# Patient Record
Sex: FEMALE | Race: BLACK OR AFRICAN AMERICAN | Employment: OTHER
[De-identification: names, ages, dates, MRNs, and addresses within clinical notes are randomized per-mention and may not be internally consistent; named-entity substitution may affect disease eponyms.]

---

## 2023-12-27 ENCOUNTER — HOSPITAL ENCOUNTER (EMERGENCY)
Facility: HOSPITAL | Age: 48
Discharge: HOME | End: 2023-12-27

## 2023-12-27 ENCOUNTER — APPOINTMENT (OUTPATIENT)
Facility: HOSPITAL | Age: 48
End: 2023-12-27

## 2023-12-27 VITALS
TEMPERATURE: 98.8 F | RESPIRATION RATE: 18 BRPM | SYSTOLIC BLOOD PRESSURE: 149 MMHG | BODY MASS INDEX: 43.16 KG/M2 | DIASTOLIC BLOOD PRESSURE: 96 MMHG | HEIGHT: 67 IN | HEART RATE: 72 BPM | WEIGHT: 275 LBS | OXYGEN SATURATION: 100 %

## 2023-12-27 DIAGNOSIS — S63.617A SPRAIN OF LEFT LITTLE FINGER, UNSPECIFIED SITE OF DIGIT, INITIAL ENCOUNTER: Primary | ICD-10-CM

## 2023-12-27 PROCEDURE — 99283 EMERGENCY DEPT VISIT LOW MDM: CPT

## 2023-12-27 PROCEDURE — 73140 X-RAY EXAM OF FINGER(S): CPT

## 2023-12-27 RX ORDER — METHYLPREDNISOLONE 4 MG/1
TABLET ORAL
Qty: 1 KIT | Refills: 0 | Status: SHIPPED | OUTPATIENT
Start: 2023-12-27

## 2023-12-27 NOTE — ED PROVIDER NOTES
splint, ice, and elevate the finger and to F/U with orthopedics if no improvement for further evaluation. Patient was in agreement with discharge plan and patient was discharged in good condition. PROGRESS NOTE:   6:40 PM  Initial assessment completed. REASSESSMENT        CONSULTS:  None    DISCHARGE NOTE:  6:53 PM  Rebeca Sharpe's  results have been reviewed with her. She has been counseled regarding her diagnosis, treatment, and plan. She verbally conveys understanding and agreement of the signs, symptoms, diagnosis, treatment and prognosis and additionally agrees to follow up as discussed. She also agrees with the care-plan and conveys that all of her questions have been answered. I have also provided discharge instructions for her that include: educational information regarding their diagnosis and treatment, and list of reasons why they would want to return to the ED prior to their follow-up appointment, should her condition change. FINAL IMPRESSION      1. Sprain of left little finger, unspecified site of digit, initial encounter            DISPOSITION/PLAN   DISPOSITION Decision To Discharge 12/27/2023 06:51:42 PM       Medication List        START taking these medications      methylPREDNISolone 4 MG tablet  Commonly known as: MEDROL DOSEPACK  Take by mouth.  Follow 6 day taper instructions on the back of the blister pack               Where to Get Your Medications        These medications were sent to [address] [place] [address],[address], [address]      Phone: [phone]   methylPREDNISolone 4 MG tablet          PATIENT REFERRED TO:  Madeline Ho MD  [address]  [phone]    Go in 1 week  As needed, If symptoms worsen    Caryle Asters, [address]  [address]  [phone]    Go in 2 weeks  As needed, If symptoms worsen      DISCHARGE